# Patient Record
Sex: FEMALE | Race: WHITE | Employment: OTHER | URBAN - METROPOLITAN AREA
[De-identification: names, ages, dates, MRNs, and addresses within clinical notes are randomized per-mention and may not be internally consistent; named-entity substitution may affect disease eponyms.]

---

## 2017-06-06 ENCOUNTER — HOSPITAL ENCOUNTER (OUTPATIENT)
Age: 67
Setting detail: SPECIMEN
Discharge: HOME | End: 2017-06-06
Payer: MEDICARE

## 2017-06-08 LAB — DERMATOLOGY PATHOLOGY REPORT: NORMAL

## 2018-09-06 ENCOUNTER — APPOINTMENT (OUTPATIENT)
Dept: MRI IMAGING | Age: 68
End: 2018-09-06
Payer: MEDICARE

## 2018-09-06 ENCOUNTER — HOSPITAL ENCOUNTER (EMERGENCY)
Age: 68
Discharge: HOME | End: 2018-09-06
Attending: EMERGENCY MEDICINE
Payer: MEDICARE

## 2018-09-06 VITALS
DIASTOLIC BLOOD PRESSURE: 77 MMHG | SYSTOLIC BLOOD PRESSURE: 152 MMHG | HEART RATE: 82 BPM | TEMPERATURE: 98.7 F | RESPIRATION RATE: 19 BRPM | WEIGHT: 256 LBS | BODY MASS INDEX: 45.36 KG/M2 | OXYGEN SATURATION: 97 % | HEIGHT: 63 IN

## 2018-09-06 DIAGNOSIS — H53.9 VISUAL CHANGES: ICD-10-CM

## 2018-09-06 DIAGNOSIS — R29.810 FACIAL WEAKNESS: ICD-10-CM

## 2018-09-06 DIAGNOSIS — R20.2 PARESTHESIA: Primary | ICD-10-CM

## 2018-09-06 DIAGNOSIS — R42 DIZZINESS: ICD-10-CM

## 2018-09-06 PROBLEM — R20.0 NUMBNESS AND TINGLING OF LEFT SIDE OF FACE: Status: ACTIVE | Noted: 2018-09-06

## 2018-09-06 LAB
-: NORMAL
ABSOLUTE EOS #: 0.17 K/UL (ref 0–0.44)
ABSOLUTE IMMATURE GRANULOCYTE: 0.04 K/UL (ref 0–0.3)
ABSOLUTE LYMPH #: 2.7 K/UL (ref 1.1–3.7)
ABSOLUTE MONO #: 0.67 K/UL (ref 0.1–1.2)
ALBUMIN SERPL-MCNC: 3.8 G/DL (ref 3.5–5.2)
ALBUMIN/GLOBULIN RATIO: 1.3 (ref 1–2.5)
ALP BLD-CCNC: 49 U/L (ref 35–104)
ALT SERPL-CCNC: 22 U/L (ref 5–33)
AMORPHOUS: NORMAL
ANION GAP SERPL CALCULATED.3IONS-SCNC: 9 MMOL/L (ref 9–17)
AST SERPL-CCNC: 15 U/L
BACTERIA: NORMAL
BASOPHILS # BLD: 0 % (ref 0–2)
BASOPHILS ABSOLUTE: 0.03 K/UL (ref 0–0.2)
BILIRUB SERPL-MCNC: 0.42 MG/DL (ref 0.3–1.2)
BILIRUBIN URINE: NEGATIVE
BUN BLDV-MCNC: 21 MG/DL (ref 8–23)
BUN/CREAT BLD: NORMAL (ref 9–20)
CALCIUM SERPL-MCNC: 9.7 MG/DL (ref 8.6–10.4)
CASTS UA: NORMAL /LPF (ref 0–8)
CHLORIDE BLD-SCNC: 100 MMOL/L (ref 98–107)
CO2: 29 MMOL/L (ref 20–31)
COLOR: YELLOW
COMMENT UA: ABNORMAL
CREAT SERPL-MCNC: 0.72 MG/DL (ref 0.5–0.9)
CRYSTALS, UA: NORMAL /HPF
DIFFERENTIAL TYPE: ABNORMAL
EKG ATRIAL RATE: 87 BPM
EKG P AXIS: -10 DEGREES
EKG P-R INTERVAL: 132 MS
EKG Q-T INTERVAL: 344 MS
EKG QRS DURATION: 74 MS
EKG QTC CALCULATION (BAZETT): 413 MS
EKG R AXIS: -5 DEGREES
EKG T AXIS: 17 DEGREES
EKG VENTRICULAR RATE: 87 BPM
EOSINOPHILS RELATIVE PERCENT: 2 % (ref 1–4)
EPITHELIAL CELLS UA: NORMAL /HPF (ref 0–5)
GFR AFRICAN AMERICAN: >60 ML/MIN
GFR NON-AFRICAN AMERICAN: >60 ML/MIN
GFR SERPL CREATININE-BSD FRML MDRD: NORMAL ML/MIN/{1.73_M2}
GFR SERPL CREATININE-BSD FRML MDRD: NORMAL ML/MIN/{1.73_M2}
GLUCOSE BLD-MCNC: 79 MG/DL (ref 70–99)
GLUCOSE URINE: NEGATIVE
HCT VFR BLD CALC: 44.4 % (ref 36.3–47.1)
HEMOGLOBIN: 13.9 G/DL (ref 11.9–15.1)
IMMATURE GRANULOCYTES: 1 %
INR BLD: 0.9
KETONES, URINE: ABNORMAL
LEUKOCYTE ESTERASE, URINE: ABNORMAL
LYMPHOCYTES # BLD: 34 % (ref 24–43)
MCH RBC QN AUTO: 28.8 PG (ref 25.2–33.5)
MCHC RBC AUTO-ENTMCNC: 31.3 G/DL (ref 28.4–34.8)
MCV RBC AUTO: 91.9 FL (ref 82.6–102.9)
MONOCYTES # BLD: 8 % (ref 3–12)
MUCUS: NORMAL
NITRITE, URINE: NEGATIVE
NRBC AUTOMATED: 0 PER 100 WBC
OTHER OBSERVATIONS UA: NORMAL
PARTIAL THROMBOPLASTIN TIME: 21.4 SEC (ref 20.5–30.5)
PDW BLD-RTO: 13.6 % (ref 11.8–14.4)
PH UA: 5 (ref 5–8)
PLATELET # BLD: 248 K/UL (ref 138–453)
PLATELET ESTIMATE: ABNORMAL
PMV BLD AUTO: 10 FL (ref 8.1–13.5)
POTASSIUM SERPL-SCNC: 4.8 MMOL/L (ref 3.7–5.3)
PROTEIN UA: NEGATIVE
PROTHROMBIN TIME: 9.7 SEC (ref 9–12)
RBC # BLD: 4.83 M/UL (ref 3.95–5.11)
RBC # BLD: ABNORMAL 10*6/UL
RBC UA: NORMAL /HPF (ref 0–4)
RENAL EPITHELIAL, UA: NORMAL /HPF
SEG NEUTROPHILS: 55 % (ref 36–65)
SEGMENTED NEUTROPHILS ABSOLUTE COUNT: 4.45 K/UL (ref 1.5–8.1)
SODIUM BLD-SCNC: 138 MMOL/L (ref 135–144)
SPECIFIC GRAVITY UA: 1.03 (ref 1–1.03)
TOTAL PROTEIN: 6.7 G/DL (ref 6.4–8.3)
TRICHOMONAS: NORMAL
TURBIDITY: ABNORMAL
URINE HGB: NEGATIVE
UROBILINOGEN, URINE: NORMAL
WBC # BLD: 8.1 K/UL (ref 3.5–11.3)
WBC # BLD: ABNORMAL 10*3/UL
WBC UA: NORMAL /HPF (ref 0–5)
YEAST: NORMAL

## 2018-09-06 PROCEDURE — 85730 THROMBOPLASTIN TIME PARTIAL: CPT

## 2018-09-06 PROCEDURE — 72148 MRI LUMBAR SPINE W/O DYE: CPT

## 2018-09-06 PROCEDURE — 85610 PROTHROMBIN TIME: CPT

## 2018-09-06 PROCEDURE — 93005 ELECTROCARDIOGRAM TRACING: CPT

## 2018-09-06 PROCEDURE — 99223 1ST HOSP IP/OBS HIGH 75: CPT | Performed by: PSYCHIATRY & NEUROLOGY

## 2018-09-06 PROCEDURE — 6370000000 HC RX 637 (ALT 250 FOR IP): Performed by: STUDENT IN AN ORGANIZED HEALTH CARE EDUCATION/TRAINING PROGRAM

## 2018-09-06 PROCEDURE — A9576 INJ PROHANCE MULTIPACK: HCPCS | Performed by: STUDENT IN AN ORGANIZED HEALTH CARE EDUCATION/TRAINING PROGRAM

## 2018-09-06 PROCEDURE — 85025 COMPLETE CBC W/AUTO DIFF WBC: CPT

## 2018-09-06 PROCEDURE — 87086 URINE CULTURE/COLONY COUNT: CPT

## 2018-09-06 PROCEDURE — 81001 URINALYSIS AUTO W/SCOPE: CPT

## 2018-09-06 PROCEDURE — 80053 COMPREHEN METABOLIC PANEL: CPT

## 2018-09-06 PROCEDURE — 99285 EMERGENCY DEPT VISIT HI MDM: CPT

## 2018-09-06 PROCEDURE — 6360000004 HC RX CONTRAST MEDICATION: Performed by: STUDENT IN AN ORGANIZED HEALTH CARE EDUCATION/TRAINING PROGRAM

## 2018-09-06 PROCEDURE — 6360000002 HC RX W HCPCS: Performed by: STUDENT IN AN ORGANIZED HEALTH CARE EDUCATION/TRAINING PROGRAM

## 2018-09-06 PROCEDURE — 99283 EMERGENCY DEPT VISIT LOW MDM: CPT | Performed by: PSYCHIATRY & NEUROLOGY

## 2018-09-06 PROCEDURE — 70553 MRI BRAIN STEM W/O & W/DYE: CPT

## 2018-09-06 PROCEDURE — 6370000000 HC RX 637 (ALT 250 FOR IP): Performed by: INTERNAL MEDICINE

## 2018-09-06 RX ORDER — FENTANYL CITRATE 50 UG/ML
50 INJECTION, SOLUTION INTRAMUSCULAR; INTRAVENOUS ONCE
Status: DISCONTINUED | OUTPATIENT
Start: 2018-09-06 | End: 2018-09-06

## 2018-09-06 RX ORDER — ACETAMINOPHEN 325 MG/1
650 TABLET ORAL ONCE
Status: COMPLETED | OUTPATIENT
Start: 2018-09-06 | End: 2018-09-06

## 2018-09-06 RX ORDER — MECLIZINE HCL 12.5 MG/1
25 TABLET ORAL ONCE
Status: COMPLETED | OUTPATIENT
Start: 2018-09-06 | End: 2018-09-06

## 2018-09-06 RX ORDER — CIPROFLOXACIN 500 MG/1
500 TABLET, FILM COATED ORAL ONCE
Status: COMPLETED | OUTPATIENT
Start: 2018-09-06 | End: 2018-09-06

## 2018-09-06 RX ORDER — ONDANSETRON 4 MG/1
4 TABLET, FILM COATED ORAL ONCE
Status: COMPLETED | OUTPATIENT
Start: 2018-09-06 | End: 2018-09-06

## 2018-09-06 RX ORDER — MECLIZINE HCL 12.5 MG/1
12.5 TABLET ORAL 2 TIMES DAILY PRN
Qty: 60 TABLET | Refills: 0 | Status: SHIPPED | OUTPATIENT
Start: 2018-09-06 | End: 2018-10-06

## 2018-09-06 RX ORDER — LEVOFLOXACIN 500 MG/1
500 TABLET, FILM COATED ORAL DAILY
Qty: 10 TABLET | Refills: 0 | Status: SHIPPED | OUTPATIENT
Start: 2018-09-06 | End: 2018-09-16

## 2018-09-06 RX ORDER — SODIUM CHLORIDE 0.9 % (FLUSH) 0.9 %
10 SYRINGE (ML) INJECTION PRN
Status: DISCONTINUED | OUTPATIENT
Start: 2018-09-06 | End: 2018-09-06 | Stop reason: HOSPADM

## 2018-09-06 RX ADMIN — CIPROFLOXACIN 500 MG: 500 TABLET ORAL at 16:46

## 2018-09-06 RX ADMIN — MECLIZINE HYDROCHLORIDE 25 MG: 12.5 TABLET, FILM COATED ORAL at 11:44

## 2018-09-06 RX ADMIN — ACETAMINOPHEN 650 MG: 325 TABLET ORAL at 15:26

## 2018-09-06 RX ADMIN — GADOTERIDOL 20 ML: 279.3 INJECTION, SOLUTION INTRAVENOUS at 13:50

## 2018-09-06 RX ADMIN — ONDANSETRON HYDROCHLORIDE 4 MG: 4 TABLET, FILM COATED ORAL at 11:44

## 2018-09-06 ASSESSMENT — ENCOUNTER SYMPTOMS
CONSTIPATION: 0
FACIAL SWELLING: 0
NAUSEA: 1
SHORTNESS OF BREATH: 0
DIARRHEA: 0
VOMITING: 0
CHEST TIGHTNESS: 0
PHOTOPHOBIA: 0

## 2018-09-06 ASSESSMENT — PAIN SCALES - GENERAL: PAINLEVEL_OUTOF10: 5

## 2018-09-06 NOTE — CONSULTS
Touch              Right Upper Extremity:  normal              Left Upper Extremity:  normal              Right Lower Extremity:  normal              Left Lower Extremity:  normal                 Proprioception              Right Upper Extremity:  normal              Left Upper Extremity:  normal              Right Lower Extremity:  normal              Left Lower Extremity:  normal    Coordination:           Finger/Nose   Right:  normal              Left:  normal          Heel-Knee-Shin                Right:  normal              Left:  normal          Rapid Alternating Movements              Right:  normal              Left:  normal          Gait:                       Casual:  Normal but on turning patient had momentary unsteadiness with turns              Toe:  normal              Heel:  normal              Tandem:  normal           Romberg:  normal            Provoked Nystagmus test: Normal (no nystagmus) until third attempt that when bringing patient to a sitting position had some dizziness    Reflexes:             Deep Tendon Reflexes:    Right Bicep:  2+  Left Bicep:  2+  Right Tricep:  2+  Left Tricep:  2+  Right Brachioradialis:  2+  Left Brachioradialis:  2+  Right Knee:  2+  Left Knee:  2+  Right Ankle:  2+  Left Ankle:  2+           Plantar response:                Right:  downgoing               Left:  downgoing                     Additional Examination Elements and Findings:     CONSTITUTIONAL:  awake, alert, cooperative, no apparent distress, and appears stated age  LUNGS:  No increased work of breathing, good air exchange, clear to auscultation bilaterally, no crackles or wheezing  CARDIOVASCULAR:  Normal apical impulse, regular rate and rhythm, normal S1 and S2, no S3 or S4, and no murmur noted  ABDOMEN:  No scars, normal bowel sounds, soft, non-distended, non-tender, no masses palpated, no hepatosplenomegally    DATA  Recent Results (from the past 24 hour(s))   CBC Auto Differential    Collection Time: 09/06/18 11:45 AM   Result Value Ref Range    WBC 8.1 3.5 - 11.3 k/uL    RBC 4.83 3.95 - 5.11 m/uL    Hemoglobin 13.9 11.9 - 15.1 g/dL    Hematocrit 44.4 36.3 - 47.1 %    MCV 91.9 82.6 - 102.9 fL    MCH 28.8 25.2 - 33.5 pg    MCHC 31.3 28.4 - 34.8 g/dL    RDW 13.6 11.8 - 14.4 %    Platelets 895 279 - 885 k/uL    MPV 10.0 8.1 - 13.5 fL    NRBC Automated 0.0 0.0 per 100 WBC    Differential Type NOT REPORTED     Seg Neutrophils 55 36 - 65 %    Lymphocytes 34 24 - 43 %    Monocytes 8 3 - 12 %    Eosinophils % 2 1 - 4 %    Basophils 0 0 - 2 %    Immature Granulocytes 1 (H) 0 %    Segs Absolute 4.45 1.50 - 8.10 k/uL    Absolute Lymph # 2.70 1.10 - 3.70 k/uL    Absolute Mono # 0.67 0.10 - 1.20 k/uL    Absolute Eos # 0.17 0.00 - 0.44 k/uL    Basophils # 0.03 0.00 - 0.20 k/uL    Absolute Immature Granulocyte 0.04 0.00 - 0.30 k/uL    WBC Morphology NOT REPORTED     RBC Morphology NOT REPORTED     Platelet Estimate NOT REPORTED    Comprehensive Metabolic Panel    Collection Time: 09/06/18 11:45 AM   Result Value Ref Range    Glucose 79 70 - 99 mg/dL    BUN 21 8 - 23 mg/dL    CREATININE 0.72 0.50 - 0.90 mg/dL    Bun/Cre Ratio NOT REPORTED 9 - 20    Calcium 9.7 8.6 - 10.4 mg/dL    Sodium 138 135 - 144 mmol/L    Potassium 4.8 3.7 - 5.3 mmol/L    Chloride 100 98 - 107 mmol/L    CO2 29 20 - 31 mmol/L    Anion Gap 9 9 - 17 mmol/L    Alkaline Phosphatase 49 35 - 104 U/L    ALT 22 5 - 33 U/L    AST 15 <32 U/L    Total Bilirubin 0.42 0.3 - 1.2 mg/dL    Total Protein 6.7 6.4 - 8.3 g/dL    Alb 3.8 3.5 - 5.2 g/dL    Albumin/Globulin Ratio 1.3 1.0 - 2.5    GFR Non-African American >60 >60 mL/min    GFR African American >60 >60 mL/min    GFR Comment          GFR Staging NOT REPORTED    Protime-INR    Collection Time: 09/06/18 11:45 AM   Result Value Ref Range    Protime 9.7 9.0 - 12.0 sec    INR 0.9    APTT    Collection Time: 09/06/18 11:45 AM   Result Value Ref Range    PTT 21.4 20.5 - 30.5 sec   Urinalysis Reflex to Culture

## 2018-09-06 NOTE — ED PROVIDER NOTES
[facility]     Emergency Department     Faculty Note/ Attestation      Pt Name: Hui Trinidad                                       MRN: [id number]  [place] 1950  Date of evaluation: 9/6/2018    Patients PCP:    Radha Becerril MD      Attestation  I performed a history and physical examination of the patient and discussed management with the resident. I reviewed the residents note and agree with the documented findings and plan of care. Any areas of disagreement are noted on the chart. I was personally present for the key portions of any procedures. I have documented in the chart those procedures where I was not present during the key portions. I have reviewed the emergency nurses triage note. I agree with the chief complaint, past medical history, past surgical history, allergies, medications, social and family history as documented unless otherwise noted below. For Physician Assistant/ Nurse Practitioner cases/documentation I have personally evaluated this patient and have completed at least one if not all key elements of the E/M (history, physical exam, and MDM). Additional findings are as noted.     Initial Screens:        Don Coma Scale  Eye Opening: Spontaneous  Best Verbal Response: Oriented  Best Motor Response: Obeys commands  Don Coma Scale Score: 15    Vitals:    Vitals:    09/06/18 1042 09/06/18 1102   BP: (!) 145/82 (!) 159/95   Pulse: 85    Resp: 16    Temp: 98.7 °F (37.1 °C)    TempSrc: Oral    SpO2: 96% 96%   Weight: 256 lb (116.1 kg)    Height: 5' 3\" (1.6 m)        [address] [place] [address]       Chief Complaint   Patient presents with    Numbness     left sided facial numbness, due to MRI, on steroid    Dizziness    Headache             DIAGNOSTIC RESULTS             RADIOLOGY:   MRI BRAIN W WO CONTRAST    (Results Pending)   MRI LUMBAR SPINE WO CONTRAST    (Results Pending)         LABS:  Labs Reviewed   CBC WITH AUTO DIFFERENTIAL - Abnormal; Notable for

## 2018-09-06 NOTE — Clinical Note
Patient Class: Inpatient [101]   REQUIRED: Diagnosis: Numbness and tingling of left side of face [1687907]   Estimated Length of Stay: Estimated stay of more than 2 midnights   Future Attending Provider: Jordana Jay [[id number]]   Admitting Provider: Jordana Jay [[id number]]   Preferred Department: Med-surg   Telemetry Bed Required?: No

## 2018-09-06 NOTE — ED NOTES
[facility] ED  Emergency Department Encounter  Medical Student     Pt Name: Bennet Alpers  MRN: [id number]  [place] 1950  Date of evaluation: 9/6/18  PCP:  No primary care provider on file. CHIEF COMPLAINT       Chief Complaint   Patient presents with    Numbness     left sided facial numbness, due to MRI, on steroid    Dizziness    Headache       HISTORY OF PRESENT ILLNESS  (Location/Symptom, Timing/Onset, Context/Setting, Quality, Duration, Modifying Factors, Severity.)      Bennet Alpers is a 78 y.o. female who presents to the ED with a 6 week history of worsening headache, dizziness, nausea, facial droop and vision changes. Pt states that she saw a neurologist for this 3 weeks ago and he ordered bilateral temporal artery biopsies to r/o temporal arteritis (biopsies came back negative) and started the pt on Prednisone which she is still on until 9/17. She was also instructed to get MRI head but has not yet been able to do so. Pt has had left sided facial droop and numbness since her daughter's birth 39 years ago. She takes Paxil for this but states droop and numbness have worsened in the past 6 weeks. Additionally, pt states her vision in the left eye is \"like I'm looking through water. \" Her headache is left sided, pulsatile. All of the aforementioned symptoms are worsened with exertion and by turning her head to the left. PAST MEDICAL / SURGICAL / SOCIAL / FAMILY HISTORY      has a past medical history of Depression and HTN (hypertension). has a past surgical history that includes knee surgery (Bilateral). Social History     Social History    Marital status:      Spouse name: N/A    Number of children: N/A    Years of education: N/A     Occupational History    Not on file.      Social History Main Topics    Smoking status: Former Smoker     Types: Cigarettes    Smokeless tobacco: Never Used    Alcohol use No    Drug use: No    Sexual activity: Not on file     Other Topics Concern    Not on file     Social History Narrative    No narrative on file       Family History   Problem Relation Age of Onset    Cancer Mother     Diabetes Father        Allergies:  Patient has no known allergies. Home Medications:  Prior to Admission medications    Not on File       REVIEW OF SYSTEMS    (2-9 systems for level 4, 10 or more for level 5)      Review of Systems   Constitutional: Negative for chills, fatigue and fever. HENT: Negative for congestion and facial swelling. Eyes: Positive for visual disturbance (left eye is blurry). Negative for photophobia. Respiratory: Negative for chest tightness and shortness of breath. Cardiovascular: Negative for chest pain and leg swelling. Gastrointestinal: Positive for nausea. Negative for constipation, diarrhea and vomiting. Genitourinary: Negative for dysuria and hematuria. Musculoskeletal: Negative for arthralgias and myalgias. Neurological: Positive for dizziness, facial asymmetry, numbness (left side of face ) and headaches. Negative for tremors and speech difficulty. PHYSICAL EXAM   (up to 7 for level 4, 8 or more for level 5)      INITIAL VITALS:   BP (!) 145/82   Pulse 85   Temp 98.7 °F (37.1 °C) (Oral)   Resp 16   Ht 5' 3\" (1.6 m)   Wt 256 lb (116.1 kg)   LMP  (LMP Unknown)   SpO2 96%   BMI 45.35 kg/m²     Physical Exam    DIFFERENTIAL  DIAGNOSIS     PLAN (LABS / IMAGING / EKG):  No orders of the defined types were placed in this encounter. MEDICATIONS ORDERED:  No orders of the defined types were placed in this encounter. DDX: Migraine, vestibular migraine, CVA, pseudotumor cerebri, bell's palsy, vertigo, labyrinthitis, Meniere's disease, BPPV, intracranial mass, intracranial hemorrhage. DIAGNOSTIC RESULTS / EMERGENCY DEPARTMENT COURSE / MDM     LABS:  No results found for this visit on 09/06/18.     IMPRESSION: ***    RADIOLOGY:  None    EKG  None    All EKG's are interpreted by

## 2018-09-06 NOTE — CONSULTS
grossly intact    Motor Exam:    Drift:  absent  Tone:  normal    Motor exam is symmetrical 5 out of 5 all extremities bilaterally    Sensory:    Touch:    Right Upper Extremity:  normal  Left Upper Extremity:  normal  Right Lower Extremity:  normal  Left Lower Extremity:  normal      Plantar Response:  Right:  downgoing  Left:  downgoing    Clonus:  absent  Pina's:  N/A    Coordination/Dysmetria:  Heel to Shin:  Right:  normal  Left:  normal  Finger to Nose:   Right:  normal  Left:  normal   Dysdiadochokinesia:  absent      INITIAL NIH STROKE SCALE:    Time Performed:  2541     1a. Level of consciousness:  0 - alert; keenly responsive  1b. Level of consciousness questions:  0 - answers both questions correctly  1c. Level of consciousness questions:  0 - performs both tasks correctly  2. Best Gaze:  0 - normal  3. Visual:  0 - no visual loss  4. Facial Palsy:  1 - minor paralysis (flattened nasolabial fold, asymmetric on smiling)  5a. Motor left arm:  0 - no drift, limb holds 90 (or 45) degrees for full 10 seconds  5b. Motor right arm:  0 - no drift, limb holds 90 (or 45) degrees for full 10 seconds  6a. Motor left le - no drift; leg holds 30 degree position for full 5 seconds  6b. Motor right le - no drift; leg holds 30 degree position for full 5 seconds  7. Limb Ataxia:  0 - absent  8. Sensory:  0 - normal; no sensory loss  9. Best Language:  0 - no aphasia, normal  10. Dysarthria:  0 - normal  11.   Extinction and Inattention:  0 - no abnormality    TOTAL:  1     SKIN:  no rash              LABS AND IMAGING:     CBC with Differential:    Lab Results   Component Value Date    WBC 8.1 2018    RBC 4.83 2018    HGB 13.9 2018    HCT 44.4 2018     2018    MCV 91.9 2018    MCH 28.8 2018    MCHC 31.3 2018    RDW 13.6 2018    LYMPHOPCT 34 2018    MONOPCT 8 2018    BASOPCT 0 2018    MONOSABS 0.67 2018

## 2018-09-06 NOTE — FLOWSHEET NOTE
[facility] CARE DEPARTMENT - [address] 84     Emergency/Trauma Note    PATIENT NAME: Kya Bond    Shift date: 9/6/18  Shift day: Thursday   Shift # 1    Room # 24/24   Name: Kya Bond            Age: 78 y.o. Gender: female          Mormonism: [id number] [facility]: [id number] [address]  Trauma/Incident type: [id number] Date & Time: 9/6/2018 10:27 AM    PATIENT/EVENT DESCRIPTION:  Kya Bond is a 78 y.o. female who arrived via ground ambulance from her home. Per report she has dealt with stroke like symptoms for some time but woke up today with a bad headache and therefore decided to come to the hospital. She had CTA and MRI. Neurology is evaluating as she does have some swelling but appears negative for a traditional stroke. Pt to be admitted to 24/24. SPIRITUAL ASSESSMENT/INTERVENTION:     09/06/18 1354   Encounter Summary   Services provided to: Patient and family together   [facility]   Continue Visiting (9/6/18)   Complexity of Encounter Moderate   Length of Encounter 30 minutes   Spiritual Assessment Completed Yes   Crisis   Type (STROKE CONSULT)   Assessment Calm; Approachable; Hopeful;Peaceful   Intervention Active listening;Explored feelings, thoughts, concerns;Prayer   Outcome Acceptance;Comfort;Expressed gratitude;Encouraged; Hopeful;Receptive     Patient welcomed me to her room and expressed that she was glad to see the . Her jannet is obvious as she repeatedly expressed her confidence that she would be healed and said \"God is taking care of me. He's got this\". Her , Margarita Bowling, and her friend, Jose G Gusman, were with her in the room. She indicates that her Protestant is praying for her and her son and daughter are also very supportive. Her daughter is a nurse and will be coming to the hospital this afternoon.     PATIENT BELONGINGS:  With patient    ANY BELONGINGS OF SIGNIFICANT VALUE NOTED:  No.    REGISTRATION STAFF NOTIFIED? Yes    WHAT IS YOUR SPIRITUAL CARE PLAN FOR THIS PATIENT?:  Chaplains are available for further spiritual and emotional support as needed.       Electronically signed by Jessica Powell on 9/6/2018 at 1:58 PM.  Michael Flores  [phone]

## 2018-09-07 LAB
CULTURE: NORMAL
Lab: NORMAL
SPECIMEN DESCRIPTION: NORMAL
STATUS: NORMAL

## 2018-09-08 NOTE — ED PROVIDER NOTES
[facility]  ED  Emergency Department Encounter  Medical Student     Pt Name: Kya Bond  MRN: [id number]  [place] 1950  Date of evaluation: 9/6/18  PCP:  Julian Harvey MD    [address] [place] [address]       Chief Complaint   Patient presents with    Numbness     left sided facial numbness, due to MRI, on steroid    Dizziness    Headache     HISTORY OF PRESENT ILLNESS  (Location/Symptom, Timing/Onset, Context/Setting, Quality, Duration, Modifying Factors, Severity.)      Kya Bond is a 78 y.o. female who presents to the ED with a 6 week history of worsening headache, dizziness, nausea, facial droop and vision changes. Pt states that she saw a neurologist for this 3 weeks ago and he ordered bilateral temporal artery biopsies to r/o temporal arteritis (biopsies came back negative) and started the pt on Prednisone which she is still on until 9/17. She was also instructed to get MRI head but has not yet been able to do so. Pt has had left sided facial droop and numbness since her daughter's birth 39 years ago. She takes Paxil for this but states droop and numbness have worsened in the past 6 weeks. Additionally, pt states her vision in the left eye is \"like I'm looking through water. \" Her headache is left sided, pulsatile. All of the aforementioned symptoms are worsened with exertion and by turning her head to the left. PAST MEDICAL / SURGICAL / SOCIAL / FAMILY HISTORY      has a past medical history of Depression and HTN (hypertension). has a past surgical history that includes knee surgery (Bilateral). Social History     Social History    Marital status:      Spouse name: N/A    Number of children: N/A    Years of education: N/A     Occupational History    Not on file.      Social History Main Topics    Smoking status: Former Smoker     Types: Cigarettes    Smokeless tobacco: Never Used    Alcohol use No    Drug use: No    Sexual activity: Not on file     Other Topics Concern    Not on file     Social History Narrative    No narrative on file       Family History   Problem Relation Age of Onset    Cancer Mother     Diabetes Father      Allergies:  Patient has no known allergies. Home Medications:  Prior to Admission medications    Not on File       REVIEW OF SYSTEMS    (2-9 systems for level 4, 10 or more for level 5)      Review of Systems   Constitutional: Negative for chills, fatigue and fever. HENT: Negative for congestion and facial swelling. Eyes: Positive for visual disturbance (left eye is blurry). Negative for photophobia. Respiratory: Negative for chest tightness and shortness of breath. Cardiovascular: Negative for chest pain and leg swelling. Gastrointestinal: Positive for nausea. Negative for constipation, diarrhea and vomiting. Genitourinary: Negative for dysuria and hematuria. Musculoskeletal: Negative for arthralgias and myalgias. Neurological: Positive for dizziness, facial asymmetry, numbness (left side of face ) and headaches. Negative for tremors and speech difficulty. PHYSICAL EXAM   (up to 7 for level 4, 8 or more for level 5)      INITIAL VITALS:   BP (!) 159/95   Pulse 85   Temp 98.7 °F (37.1 °C) (Oral)   Resp 16   Ht 5' 3\" (1.6 m)   Wt 256 lb (116.1 kg)   LMP  (LMP Unknown)   SpO2 96%   BMI 45.35 kg/m²     Physical Exam   Constitutional: She is oriented to person, place, and time. She appears well-developed and well-nourished. No distress. HENT:   Head: Normocephalic and atraumatic. Right Ear: External ear normal.   Left Ear: External ear normal.   Mouth/Throat: Oropharynx is clear and moist.   Left-sided facial droop that is been chronic for the last 41 years. Decreased sensation over the left side of face   Eyes: Pupils are equal, round, and reactive to light. Conjunctivae and EOM are normal.   Neck: Normal range of motion. No JVD present. No tracheal deviation present.    Cardiovascular: Normal rate and intact distal pulses. Exam reveals no gallop and no friction rub. No murmur heard. Pulmonary/Chest: Effort normal and breath sounds normal. No respiratory distress. She has no wheezes. Abdominal: Soft. She exhibits no distension. There is no tenderness. Musculoskeletal: Normal range of motion. She exhibits no deformity. Neurological: She is alert and oriented to person, place, and time. Skin: Skin is warm and dry. She is not diaphoretic. DIFFERENTIAL  DIAGNOSIS     PLAN (LABS / IMAGING / EKG):  Orders Placed This Encounter   Procedures    MRI BRAIN W WO CONTRAST    MRI LUMBAR SPINE WO CONTRAST    CBC Auto Differential    Comprehensive Metabolic Panel    Protime-INR    APTT    Urinalysis Reflex to Culture    Inpatient consult to Neurology    EKG 12 Lead     MEDICATIONS ORDERED:  Orders Placed This Encounter   Medications    meclizine (ANTIVERT) tablet 25 mg    ondansetron (ZOFRAN) tablet 4 mg     DDX: Migraine, vestibular migraine, CVA, pseudotumor cerebri, bell's palsy, vertigo, labyrinthitis, Meniere's disease, BPPV, intracranial mass, intracranial hemorrhage.      DIAGNOSTIC RESULTS / EMERGENCY DEPARTMENT COURSE / MDM     LABS:  Results for orders placed or performed during the hospital encounter of 09/06/18   CBC Auto Differential   Result Value Ref Range    WBC 8.1 3.5 - 11.3 k/uL    RBC 4.83 3.95 - 5.11 m/uL    Hemoglobin 13.9 11.9 - 15.1 g/dL    Hematocrit 44.4 36.3 - 47.1 %    MCV 91.9 82.6 - 102.9 fL    MCH 28.8 25.2 - 33.5 pg    MCHC 31.3 28.4 - 34.8 g/dL    RDW 13.6 11.8 - 14.4 %    Platelets 997 882 - 061 k/uL    MPV 10.0 8.1 - 13.5 fL    NRBC Automated 0.0 0.0 per 100 WBC    Differential Type NOT REPORTED     Seg Neutrophils 55 36 - 65 %    Lymphocytes 34 24 - 43 %    Monocytes 8 3 - 12 %    Eosinophils % 2 1 - 4 %    Basophils 0 0 - 2 %    Immature Granulocytes 1 (H) 0 %    Segs Absolute 4.45 1.50 - 8.10 k/uL    Absolute Lymph # 2.70 1.10 - 3.70 k/uL    Absolute Mono # 0.67 0.10 - 1.20 k/uL    Absolute Eos # 0.17 0.00 - 0.44 k/uL    Basophils # 0.03 0.00 - 0.20 k/uL    Absolute Immature Granulocyte 0.04 0.00 - 0.30 k/uL    WBC Morphology NOT REPORTED     RBC Morphology NOT REPORTED     Platelet Estimate NOT REPORTED    Comprehensive Metabolic Panel   Result Value Ref Range    Glucose 79 70 - 99 mg/dL    BUN 21 8 - 23 mg/dL    CREATININE 0.72 0.50 - 0.90 mg/dL    Bun/Cre Ratio NOT REPORTED 9 - 20    Calcium 9.7 8.6 - 10.4 mg/dL    Sodium 138 135 - 144 mmol/L    Potassium 4.8 3.7 - 5.3 mmol/L    Chloride 100 98 - 107 mmol/L    CO2 29 20 - 31 mmol/L    Anion Gap 9 9 - 17 mmol/L    Alkaline Phosphatase 49 35 - 104 U/L    ALT 22 5 - 33 U/L    AST 15 <32 U/L    Total Bilirubin 0.42 0.3 - 1.2 mg/dL    Total Protein 6.7 6.4 - 8.3 g/dL    Alb 3.8 3.5 - 5.2 g/dL    Albumin/Globulin Ratio 1.3 1.0 - 2.5    GFR Non-African American >60 >60 mL/min    GFR African American >60 >60 mL/min    GFR Comment          GFR Staging NOT REPORTED    Protime-INR   Result Value Ref Range    Protime 9.7 9.0 - 12.0 sec    INR 0.9    APTT   Result Value Ref Range    PTT 21.4 20.5 - 30.5 sec       IMPRESSION: Left sided-facial drrop with visual changes    RADIOLOGY:  None    EKG  None    All EKG's are interpreted by the Emergency Department Physician who either signs or Co-signs this chart in the absence of a cardiologist.    EMERGENCY DEPARTMENT COURSE:  Based on pt's symptoms, will order CBC, CMP, MRI of head and consult neuro. Will give pt meclizine and Zofran and reassess. Neuro value with the patient in emergency department after admission was put through per neurology and neurosurgery residents. Neurology attending evaluated the patient in emergency department and deemed her appropriate for discharge. The patient was already admitted and already admitted into a boarding status so discharge was completed by neurology residents. FINAL IMPRESSION      1. Paresthesia    2. Dizziness    3.  Facial weakness 4. Visual changes          DISPOSITION / PLAN     DISPOSITION Decision To Admit 09/06/2018 11:54:36 AM      PATIENT REFERRED TO:  No follow-up provider specified.     DISCHARGE MEDICATIONS:  New Prescriptions    No medications on file       Ernie Najera MD  Emergency Medicine Resident       Ernie Najera MD  Resident  09/08/18 0594

## 2021-08-03 RX ORDER — PAROXETINE 30 MG/1
30 TABLET, FILM COATED ORAL EVERY MORNING
COMMUNITY

## 2021-08-03 RX ORDER — SODIUM CHLORIDE 9 MG/ML
INJECTION, SOLUTION INTRAVENOUS CONTINUOUS
Status: DISCONTINUED | OUTPATIENT
Start: 2021-08-04 | End: 2021-08-04 | Stop reason: HOSPADM

## 2021-08-03 RX ORDER — ACETAMINOPHEN 160 MG
1000 TABLET,DISINTEGRATING ORAL DAILY
COMMUNITY
End: 2022-05-10

## 2021-08-03 RX ORDER — MAGNESIUM 30 MG
400 TABLET ORAL DAILY
COMMUNITY
End: 2022-05-10

## 2021-08-03 RX ORDER — LISINOPRIL 10 MG/1
10 TABLET ORAL DAILY
Status: ON HOLD | COMMUNITY
End: 2021-08-04 | Stop reason: SDUPTHER

## 2021-08-03 RX ORDER — HYDROCHLOROTHIAZIDE 12.5 MG/1
12.5 CAPSULE, GELATIN COATED ORAL DAILY
COMMUNITY
End: 2022-05-10

## 2021-08-03 RX ORDER — ASPIRIN 81 MG/1
81 TABLET, CHEWABLE ORAL ONCE
Status: DISCONTINUED | OUTPATIENT
Start: 2021-08-04 | End: 2021-08-04 | Stop reason: HOSPADM

## 2021-08-03 RX ORDER — OMEPRAZOLE 20 MG/1
20 CAPSULE, DELAYED RELEASE ORAL DAILY PRN
COMMUNITY

## 2021-08-04 ENCOUNTER — HOSPITAL ENCOUNTER (OUTPATIENT)
Dept: CARDIAC CATH/INVASIVE PROCEDURES | Age: 71
Discharge: HOME | End: 2021-08-04
Attending: INTERNAL MEDICINE | Admitting: INTERNAL MEDICINE
Payer: MEDICARE

## 2021-08-04 VITALS
HEIGHT: 63 IN | DIASTOLIC BLOOD PRESSURE: 95 MMHG | HEART RATE: 84 BPM | SYSTOLIC BLOOD PRESSURE: 159 MMHG | TEMPERATURE: 97.7 F | RESPIRATION RATE: 16 BRPM | BODY MASS INDEX: 49.43 KG/M2 | OXYGEN SATURATION: 98 % | WEIGHT: 279 LBS

## 2021-08-04 LAB
LV EF: 63 %
LVEF MODALITY: NORMAL

## 2021-08-04 PROCEDURE — 93460 R&L HRT ART/VENTRICLE ANGIO: CPT | Performed by: INTERNAL MEDICINE

## 2021-08-04 PROCEDURE — 6360000004 HC RX CONTRAST MEDICATION

## 2021-08-04 PROCEDURE — 6370000000 HC RX 637 (ALT 250 FOR IP)

## 2021-08-04 PROCEDURE — 2500000003 HC RX 250 WO HCPCS

## 2021-08-04 PROCEDURE — C1751 CATH, INF, PER/CENT/MIDLINE: HCPCS

## 2021-08-04 PROCEDURE — C1894 INTRO/SHEATH, NON-LASER: HCPCS

## 2021-08-04 PROCEDURE — 2709999900 HC NON-CHARGEABLE SUPPLY

## 2021-08-04 PROCEDURE — 2580000003 HC RX 258: Performed by: INTERNAL MEDICINE

## 2021-08-04 PROCEDURE — 93312 ECHO TRANSESOPHAGEAL: CPT

## 2021-08-04 PROCEDURE — 6360000002 HC RX W HCPCS

## 2021-08-04 RX ORDER — SIMVASTATIN 10 MG
10 TABLET ORAL NIGHTLY
COMMUNITY
End: 2022-05-10

## 2021-08-04 RX ORDER — ACETAMINOPHEN 325 MG/1
650 TABLET ORAL EVERY 4 HOURS PRN
Status: DISCONTINUED | OUTPATIENT
Start: 2021-08-04 | End: 2021-08-04 | Stop reason: HOSPADM

## 2021-08-04 RX ORDER — SODIUM CHLORIDE 0.9 % (FLUSH) 0.9 %
5-40 SYRINGE (ML) INJECTION EVERY 12 HOURS SCHEDULED
Status: DISCONTINUED | OUTPATIENT
Start: 2021-08-04 | End: 2021-08-04 | Stop reason: HOSPADM

## 2021-08-04 RX ORDER — ASPIRIN 81 MG/1
81 TABLET, CHEWABLE ORAL DAILY
COMMUNITY
End: 2022-05-10

## 2021-08-04 RX ORDER — LISINOPRIL 20 MG/1
20 TABLET ORAL DAILY
Qty: 30 TABLET | Refills: 5 | Status: ON HOLD | OUTPATIENT
Start: 2021-08-04 | End: 2022-05-11 | Stop reason: HOSPADM

## 2021-08-04 RX ORDER — ATROPINE SULFATE 0.1 MG/ML
INJECTION INTRAVENOUS
Status: DISCONTINUED
Start: 2021-08-04 | End: 2021-08-04 | Stop reason: HOSPADM

## 2021-08-04 RX ORDER — SODIUM CHLORIDE 0.9 % (FLUSH) 0.9 %
5-40 SYRINGE (ML) INJECTION PRN
Status: DISCONTINUED | OUTPATIENT
Start: 2021-08-04 | End: 2021-08-04 | Stop reason: HOSPADM

## 2021-08-04 RX ORDER — METOPROLOL SUCCINATE 25 MG/1
25 TABLET, EXTENDED RELEASE ORAL DAILY
COMMUNITY

## 2021-08-04 RX ORDER — SODIUM CHLORIDE 9 MG/ML
25 INJECTION, SOLUTION INTRAVENOUS PRN
Status: DISCONTINUED | OUTPATIENT
Start: 2021-08-04 | End: 2021-08-04 | Stop reason: HOSPADM

## 2021-08-04 RX ADMIN — SODIUM CHLORIDE: 9 INJECTION, SOLUTION INTRAVENOUS at 08:05

## 2021-08-04 ASSESSMENT — PAIN - FUNCTIONAL ASSESSMENT: PAIN_FUNCTIONAL_ASSESSMENT: 0-10

## 2021-08-04 ASSESSMENT — PAIN DESCRIPTION - DESCRIPTORS: DESCRIPTORS: PRESSURE

## 2021-08-04 NOTE — CARE COORDINATION
Discharge Planning:    Writer in to see patient and daughter to determine needs at d/c. Patient is s/p cardiac cath with Dr. Arabella Montague with no intervention necessary. Patient and daughter deny any needs at d/c. Patient to d/c home today.

## 2021-08-04 NOTE — PROGRESS NOTES
Patient given discharge instructions verbal and written including new medications and side effects information reviewed. Follow up appointment also included with discharge paperwork. Patient verbalizes understanding. Patient discharged with all personal belongings with family via wheelchair to home. See discharge event for time of discharge.

## 2021-08-04 NOTE — PROGRESS NOTES
Patient admitted to room 2031 from cath lab post cardiac cath with Dr. Wilma Muñiz. Patient is alert and oriented. V Sheath pulled at 0941. A Sheath pulled at 0942. Sheaths pulled per Cath Lab staff, manual pressure applied for approx 10 minutes. Vitals as charted. Patient and family oriented to room and call light. Patient instructed to keep head and right leg flat - patient verbalizes understanding. Groin site is soft, no evidence of bleeding, bruising or hematoma. Pedal pulses palpable. Signed and held orders released and reviewed. Will continue to assess and monitor.

## 2021-12-02 DIAGNOSIS — U07.1 COVID-19: ICD-10-CM

## 2021-12-02 RX ORDER — SODIUM CHLORIDE 9 MG/ML
25 INJECTION, SOLUTION INTRAVENOUS PRN
OUTPATIENT
Start: 2021-12-02

## 2021-12-02 RX ORDER — SODIUM CHLORIDE 9 MG/ML
INJECTION, SOLUTION INTRAVENOUS CONTINUOUS
OUTPATIENT
Start: 2021-12-02

## 2021-12-02 RX ORDER — SODIUM CHLORIDE 0.9 % (FLUSH) 0.9 %
5-40 SYRINGE (ML) INJECTION PRN
OUTPATIENT
Start: 2021-12-02

## 2021-12-03 ENCOUNTER — HOSPITAL ENCOUNTER (OUTPATIENT)
Dept: INFUSION THERAPY | Age: 71
Setting detail: INFUSION SERIES
Discharge: HOME | End: 2021-12-03
Attending: INTERNAL MEDICINE
Payer: MEDICARE

## 2021-12-03 VITALS
HEART RATE: 90 BPM | RESPIRATION RATE: 16 BRPM | DIASTOLIC BLOOD PRESSURE: 63 MMHG | OXYGEN SATURATION: 95 % | SYSTOLIC BLOOD PRESSURE: 100 MMHG | TEMPERATURE: 97.3 F

## 2021-12-03 DIAGNOSIS — U07.1 COVID-19: Primary | ICD-10-CM

## 2021-12-03 PROCEDURE — 2580000003 HC RX 258: Performed by: NURSE PRACTITIONER

## 2021-12-03 PROCEDURE — 6360000002 HC RX W HCPCS: Performed by: NURSE PRACTITIONER

## 2021-12-03 PROCEDURE — 2500000003 HC RX 250 WO HCPCS: Performed by: NURSE PRACTITIONER

## 2021-12-03 PROCEDURE — 96365 THER/PROPH/DIAG IV INF INIT: CPT

## 2021-12-03 PROCEDURE — M0243 CASIRIVI AND IMDEVI INFUSION: HCPCS

## 2021-12-03 RX ORDER — SODIUM CHLORIDE 9 MG/ML
25 INJECTION, SOLUTION INTRAVENOUS PRN
OUTPATIENT
Start: 2021-12-03

## 2021-12-03 RX ORDER — SODIUM CHLORIDE 9 MG/ML
INJECTION, SOLUTION INTRAVENOUS CONTINUOUS
OUTPATIENT
Start: 2021-12-03

## 2021-12-03 RX ORDER — SODIUM CHLORIDE 0.9 % (FLUSH) 0.9 %
5-40 SYRINGE (ML) INJECTION PRN
OUTPATIENT
Start: 2021-12-03

## 2021-12-03 RX ADMIN — IMDEVIMAB: 300 INJECTION, SOLUTION, CONCENTRATE INTRAVENOUS at 09:24

## 2022-05-10 RX ORDER — FUROSEMIDE 20 MG/1
20 TABLET ORAL DAILY
COMMUNITY

## 2022-05-10 RX ORDER — HYDRALAZINE HYDROCHLORIDE 25 MG/1
25 TABLET, FILM COATED ORAL 2 TIMES DAILY
COMMUNITY

## 2022-05-10 RX ORDER — ROSUVASTATIN CALCIUM 10 MG/1
10 TABLET, COATED ORAL NIGHTLY
COMMUNITY

## 2022-05-10 RX ORDER — PRAMIPEXOLE DIHYDROCHLORIDE 0.25 MG/1
0.25 TABLET ORAL DAILY
COMMUNITY

## 2022-05-11 ENCOUNTER — HOSPITAL ENCOUNTER (OUTPATIENT)
Dept: CARDIAC CATH/INVASIVE PROCEDURES | Age: 72
Discharge: HOME | End: 2022-05-11
Attending: INTERNAL MEDICINE | Admitting: INTERNAL MEDICINE
Payer: MEDICARE

## 2022-05-11 VITALS
SYSTOLIC BLOOD PRESSURE: 152 MMHG | HEART RATE: 66 BPM | WEIGHT: 293 LBS | TEMPERATURE: 96.8 F | DIASTOLIC BLOOD PRESSURE: 90 MMHG | BODY MASS INDEX: 51.91 KG/M2 | RESPIRATION RATE: 20 BRPM | OXYGEN SATURATION: 95 % | HEIGHT: 63 IN

## 2022-05-11 LAB
ANION GAP SERPL CALCULATED.3IONS-SCNC: 11 MMOL/L (ref 9–17)
BUN BLDV-MCNC: 20 MG/DL (ref 8–23)
BUN/CREAT BLD: 26 (ref 9–20)
CALCIUM SERPL-MCNC: 9.4 MG/DL (ref 8.6–10.4)
CHLORIDE BLD-SCNC: 103 MMOL/L (ref 98–107)
CO2: 24 MMOL/L (ref 20–31)
CREAT SERPL-MCNC: 0.76 MG/DL (ref 0.5–0.9)
GFR AFRICAN AMERICAN: >60 ML/MIN
GFR NON-AFRICAN AMERICAN: >60 ML/MIN
GFR SERPL CREATININE-BSD FRML MDRD: ABNORMAL ML/MIN/{1.73_M2}
GLUCOSE BLD-MCNC: 103 MG/DL (ref 70–99)
LV EF: 60 %
LVEF MODALITY: NORMAL
POTASSIUM SERPL-SCNC: 4.5 MMOL/L (ref 3.7–5.3)
SODIUM BLD-SCNC: 138 MMOL/L (ref 135–144)

## 2022-05-11 PROCEDURE — 93312 ECHO TRANSESOPHAGEAL: CPT

## 2022-05-11 PROCEDURE — 93005 ELECTROCARDIOGRAM TRACING: CPT | Performed by: INTERNAL MEDICINE

## 2022-05-11 PROCEDURE — 99152 MOD SED SAME PHYS/QHP 5/>YRS: CPT

## 2022-05-11 PROCEDURE — 6360000002 HC RX W HCPCS

## 2022-05-11 PROCEDURE — 6370000000 HC RX 637 (ALT 250 FOR IP)

## 2022-05-11 PROCEDURE — 80048 BASIC METABOLIC PNL TOTAL CA: CPT

## 2022-05-11 PROCEDURE — 7100000010 HC PHASE II RECOVERY - FIRST 15 MIN

## 2022-05-11 PROCEDURE — 2580000003 HC RX 258: Performed by: INTERNAL MEDICINE

## 2022-05-11 PROCEDURE — 7100000011 HC PHASE II RECOVERY - ADDTL 15 MIN

## 2022-05-11 RX ORDER — SODIUM CHLORIDE 9 MG/ML
INJECTION, SOLUTION INTRAVENOUS CONTINUOUS
Status: DISCONTINUED | OUTPATIENT
Start: 2022-05-11 | End: 2022-05-16 | Stop reason: HOSPADM

## 2022-05-11 RX ORDER — ASPIRIN 81 MG/1
81 TABLET ORAL DAILY
COMMUNITY

## 2022-05-11 RX ADMIN — SODIUM CHLORIDE: 9 INJECTION, SOLUTION INTRAVENOUS at 09:32

## 2022-05-11 ASSESSMENT — PAIN SCALES - GENERAL
PAINLEVEL_OUTOF10: 0

## 2022-05-11 ASSESSMENT — PAIN - FUNCTIONAL ASSESSMENT: PAIN_FUNCTIONAL_ASSESSMENT: 0-10

## 2022-05-12 LAB
EKG ATRIAL RATE: 67 BPM
EKG P AXIS: 52 DEGREES
EKG P-R INTERVAL: 168 MS
EKG Q-T INTERVAL: 414 MS
EKG QRS DURATION: 86 MS
EKG QTC CALCULATION (BAZETT): 437 MS
EKG R AXIS: -7 DEGREES
EKG T AXIS: 25 DEGREES
EKG VENTRICULAR RATE: 67 BPM

## 2022-07-30 ENCOUNTER — HOSPITAL ENCOUNTER (EMERGENCY)
Age: 72
Discharge: HOME | End: 2022-07-30
Attending: EMERGENCY MEDICINE
Payer: MEDICARE

## 2022-07-30 ENCOUNTER — APPOINTMENT (OUTPATIENT)
Dept: GENERAL RADIOLOGY | Age: 72
End: 2022-07-30
Payer: MEDICARE

## 2022-07-30 VITALS
HEIGHT: 64 IN | RESPIRATION RATE: 18 BRPM | DIASTOLIC BLOOD PRESSURE: 91 MMHG | HEART RATE: 93 BPM | TEMPERATURE: 98.2 F | OXYGEN SATURATION: 96 % | SYSTOLIC BLOOD PRESSURE: 165 MMHG | BODY MASS INDEX: 50.02 KG/M2 | WEIGHT: 293 LBS

## 2022-07-30 DIAGNOSIS — S49.91XA INJURY OF RIGHT SHOULDER, INITIAL ENCOUNTER: Primary | ICD-10-CM

## 2022-07-30 PROCEDURE — 73030 X-RAY EXAM OF SHOULDER: CPT

## 2022-07-30 PROCEDURE — 99283 EMERGENCY DEPT VISIT LOW MDM: CPT

## 2022-07-30 PROCEDURE — 6370000000 HC RX 637 (ALT 250 FOR IP): Performed by: NURSE PRACTITIONER

## 2022-07-30 PROCEDURE — 73060 X-RAY EXAM OF HUMERUS: CPT

## 2022-07-30 RX ORDER — HYDROCODONE BITARTRATE AND ACETAMINOPHEN 5; 325 MG/1; MG/1
1 TABLET ORAL EVERY 8 HOURS PRN
Qty: 12 TABLET | Refills: 0 | Status: SHIPPED | OUTPATIENT
Start: 2022-07-30 | End: 2022-08-03

## 2022-07-30 RX ORDER — ACETAMINOPHEN 325 MG/1
650 TABLET ORAL ONCE
Status: COMPLETED | OUTPATIENT
Start: 2022-07-30 | End: 2022-07-30

## 2022-07-30 RX ADMIN — ACETAMINOPHEN 650 MG: 325 TABLET, FILM COATED ORAL at 16:54

## 2022-07-30 ASSESSMENT — PAIN DESCRIPTION - LOCATION
LOCATION: ARM
LOCATION: ARM;SHOULDER

## 2022-07-30 ASSESSMENT — PAIN DESCRIPTION - ORIENTATION
ORIENTATION: RIGHT
ORIENTATION: RIGHT

## 2022-07-30 ASSESSMENT — ENCOUNTER SYMPTOMS
ABDOMINAL PAIN: 0
FACIAL SWELLING: 0
VOMITING: 0
BACK PAIN: 0
NAUSEA: 0
COLOR CHANGE: 0
SHORTNESS OF BREATH: 0
TROUBLE SWALLOWING: 0

## 2022-07-30 ASSESSMENT — PAIN SCALES - GENERAL
PAINLEVEL_OUTOF10: 4
PAINLEVEL_OUTOF10: 7

## 2022-07-30 ASSESSMENT — LIFESTYLE VARIABLES
HOW OFTEN DO YOU HAVE A DRINK CONTAINING ALCOHOL: 2-4 TIMES A MONTH
HOW MANY STANDARD DRINKS CONTAINING ALCOHOL DO YOU HAVE ON A TYPICAL DAY: 1 OR 2

## 2022-07-30 NOTE — DISCHARGE INSTRUCTIONS
Norco:  WARNING:  May cause drowsiness. May impair ability to operate vehicles or machinery. Do not use in combination with alcohol.

## 2022-07-30 NOTE — ED PROVIDER NOTES
EYE SURGERY      left eye cataract    JOINT REPLACEMENT      bilat knees     KNEE SURGERY Bilateral     OVARY SURGERY      SKIN BIOPSY      VASCULAR SURGERY      vein stripping    WRIST SURGERY Left          FAMILY HISTORY           Problem Relation Age of Onset    Cancer Mother     Diabetes Father      Family Status   Relation Name Status    Mother  (Not Specified)    Father  (Not Specified)        SOCIAL HISTORY      reports that she quit smoking about 42 years ago. Her smoking use included cigarettes. She has never used smokeless tobacco. She reports current alcohol use. She reports that she does not use drugs. REVIEW OF SYSTEMS    (2-9 systems for level 4, 10 or more for level 5)     Review of Systems   Constitutional:  Negative for chills, diaphoresis, fatigue and fever. HENT:  Negative for facial swelling, nosebleeds and trouble swallowing. Respiratory:  Negative for shortness of breath. Cardiovascular:  Negative for chest pain. Gastrointestinal:  Negative for abdominal pain, nausea and vomiting. Musculoskeletal:  Positive for arthralgias and myalgias. Negative for back pain, gait problem and neck pain. Skin:  Negative for color change, rash and wound. Neurological:  Negative for dizziness, syncope, facial asymmetry, speech difficulty, weakness, light-headedness, numbness and headaches. Except as noted above the remainder of the review of systems was reviewed and negative. PHYSICAL EXAM    (up to 7 for level 4, 8 or more for level 5)     ED Triage Vitals   BP Temp Temp src Pulse Resp SpO2 Height Weight   -- -- -- -- -- -- -- --     Physical Exam  Vitals reviewed. Constitutional:       General: She is not in acute distress. Appearance: She is well-developed. She is not diaphoretic. HENT:      Head: No raccoon eyes or Muñiz's sign. Eyes:      Conjunctiva/sclera: Conjunctivae normal.   Cardiovascular:      Rate and Rhythm: Normal rate. Pulses: Normal pulses. Pulmonary:      Effort: Pulmonary effort is normal. No respiratory distress. Breath sounds: No stridor. Musculoskeletal:      Right shoulder: Tenderness present. No swelling or deformity. Decreased range of motion. Left shoulder: No swelling, deformity or tenderness. Normal range of motion. Right upper arm: Tenderness present. No swelling or deformity. Right elbow: No swelling or deformity. Normal range of motion. No tenderness. Cervical back: No swelling, deformity, tenderness or bony tenderness. Skin:     General: Skin is warm and dry. Capillary Refill: Capillary refill takes less than 2 seconds. Findings: No rash. Neurological:      Mental Status: She is alert and oriented to person, place, and time. GCS: GCS eye subscore is 4. GCS verbal subscore is 5. GCS motor subscore is 6. Motor: Motor function is intact. Coordination: Coordination is intact. Gait: Gait is intact. Psychiatric:         Behavior: Behavior normal.        DIAGNOSTIC RESULTS     RADIOLOGY:   Non-plain film images such as CT, Ultrasound and MRI are read by the radiologist. Plain radiographic images are visualized and preliminarily interpreted by the emergency physician with the below findings:    Interpretation per the Radiologist below, if available at the time of this note:    XR SHOULDER RIGHT (MIN 2 VIEWS)    Result Date: 7/30/2022  EXAMINATION: THREE XRAY VIEWS OF THE RIGHT SHOULDER; TWO XRAY VIEWS OF THE RIGHT HUMERUS 7/30/2022 1:09 pm COMPARISON: None. HISTORY: ORDERING SYSTEM PROVIDED HISTORY: injury TECHNOLOGIST PROVIDED HISTORY: injury Reason for Exam: Right arm pain, fall today FINDINGS: There is no evidence of acute fracture. There is normal alignment. No acute joint abnormality. No focal osseous lesion. No focal soft tissue abnormality. Surgical clips overlying the right upper hemithorax. No acute osseous abnormality.      XR HUMERUS RIGHT (MIN 2 VIEWS)    Result Date: 7/30/2022  EXAMINATION: THREE XRAY VIEWS OF THE RIGHT SHOULDER; TWO XRAY VIEWS OF THE RIGHT HUMERUS 7/30/2022 1:09 pm COMPARISON: None. HISTORY: ORDERING SYSTEM PROVIDED HISTORY: injury TECHNOLOGIST PROVIDED HISTORY: injury Reason for Exam: Right arm pain, fall today FINDINGS: There is no evidence of acute fracture. There is normal alignment. No acute joint abnormality. No focal osseous lesion. No focal soft tissue abnormality. Surgical clips overlying the right upper hemithorax. No acute osseous abnormality. EMERGENCY DEPARTMENT COURSE and DIFFERENTIAL DIAGNOSIS/MDM:   Vitals:    Vitals:    07/30/22 1601 07/30/22 1610   BP: (!) 165/91    Pulse: 93 93   Resp: 18    Temp: 98.2 °F (36.8 °C) 98.2 °F (36.8 °C)   TempSrc: Oral    SpO2: 96%    Weight: 298 lb (135.2 kg)    Height: 5' 4\" (1.626 m)          MEDICATIONS GIVEN IN THE ED:  Medications   acetaminophen (TYLENOL) tablet 650 mg (has no administration in time range)       CLINICAL DECISION MAKING:  The patient presented alert with a nontoxic appearance and was seen in conjunction with Dr. Berlin Ann. Imaging was negative for acute findings. A sling was applied. The application was checked and was appropriate; the RUE remained NVI. She requested only tylenol here for pain, nothing stronger. [place] was reviewed. A prescription was written for norco. The patient was advised to not drink alcohol, drive, or operate heavy machinery while taking the norco. Evaluation and treatment course in the ED, and plan of care upon discharge was discussed in length with the patient. Patient had no further questions prior to being discharged and was instructed to return to the ED for new or worsening symptoms. Care was provided during an unprecedented national emergency due to the novel coronavirus, Covid-19. FINAL IMPRESSION      1.  Injury of right shoulder, initial encounter            Problem List  Patient Active Problem List   Diagnosis Code    Numbness and tingling of left side of face R20.0, R20.2    COVID-19 U07.1         DISPOSITION/PLAN   DISPOSITION Decision To Discharge 07/30/2022 04:43:48 PM      PATIENT REFERRED TO:   Nan Benites MD  [address] [place] [address]  [facility] [address] [facility]  [phone]    Schedule an appointment as soon as possible for a visit       DISCHARGE MEDICATIONS:     New Prescriptions    HYDROCODONE-ACETAMINOPHEN (NORCO) 5-325 MG PER TABLET    Take 1 tablet by mouth every 8 hours as needed for Pain for up to 4 days.            (Please note that portions of this note were completed with a voice recognition program.  Efforts were made to edit the dictations but occasionally words are mis-transcribed.)    ISAAK Gutierrez CNP, APRN - CNP  07/30/22 1950

## 2022-07-30 NOTE — ED PROVIDER NOTES
eMERGENCY dEPARTMENT eNCOUnter   Independent Attestation     Pt Name: Keturah Calle  MRN: [id number]  Evangfjesus 1950  Date of evaluation: 7/30/22     Keturah Calle is a 80 y.o. female with CC: Fall (Patient reports she tripped over broom handle, and hurt right arm/shoulder. No LOC, pt A&Ox4)        This visit was performed by both a physician and an APC. I performed all aspects of the MDM as documented. The care is provided during an unprecedented national emergency due to the novel coronavirus, COVID 19.     Zeina Luu MD  Attending Emergency Physician           Dallas Almeida MD  07/30/22 1700

## 2023-12-28 LAB
ALBUMIN: 3.9 G/DL
ALK PHOSPHATASE: 62 U/L
ALT SERPL-CCNC: 26 U/L
AST SERPL-CCNC: 25 U/L
BILIRUB SERPL-MCNC: 0.6 MG/DL
BUN BLDV-MCNC: 22 MG/DL
CALCIUM SERPL-MCNC: 9.1 MG/DL
CHLORIDE BLD-SCNC: 105 MMOL/L
CHOLESTEROL/HDL RATIO: 3.5
CHOLESTEROL: 207 MG/DL
CO2: 27 MMOL/L
CPK: 70 U/L
CREAT SERPL-MCNC: 0.75 MG/DL
GFR AFRICAN AMERICAN: 91.9 ML/M1.7
GFR NON-AFRICAN AMERICAN: 76 ML/M1.7
GLUCOSE: 107 MG/DL
HDLC SERPL-MCNC: 60 MG/DL
LDL CHOLESTEROL CALCULATED: 129 MG/DL
POTASSIUM SERPL-SCNC: 4.5 MMOL/L
SODIUM BLD-SCNC: 141 MMOL/L
TOTAL PROTEIN: 6.5 G/DL
TRIGL SERPL-MCNC: 92 MG/DL
VLDLC SERPL CALC-MCNC: 18 MG/DL